# Patient Record
Sex: FEMALE | Race: WHITE
[De-identification: names, ages, dates, MRNs, and addresses within clinical notes are randomized per-mention and may not be internally consistent; named-entity substitution may affect disease eponyms.]

---

## 2019-12-23 ENCOUNTER — HOSPITAL ENCOUNTER (OUTPATIENT)
Dept: HOSPITAL 84 - D.HCCARDIO | Age: 69
Discharge: HOME | End: 2019-12-23
Attending: INTERNAL MEDICINE
Payer: MEDICARE

## 2019-12-23 VITALS — BODY MASS INDEX: 40.4 KG/M2

## 2019-12-23 DIAGNOSIS — I25.119: Primary | ICD-10-CM

## 2020-01-20 ENCOUNTER — HOSPITAL ENCOUNTER (OUTPATIENT)
Dept: HOSPITAL 84 - D.CATH | Age: 70
End: 2020-01-20
Attending: INTERNAL MEDICINE
Payer: MEDICARE

## 2020-01-20 VITALS
WEIGHT: 212.45 LBS | HEIGHT: 64 IN | WEIGHT: 212.45 LBS | BODY MASS INDEX: 36.27 KG/M2 | BODY MASS INDEX: 36.27 KG/M2 | HEIGHT: 64 IN

## 2020-01-20 VITALS — DIASTOLIC BLOOD PRESSURE: 85 MMHG | SYSTOLIC BLOOD PRESSURE: 170 MMHG

## 2020-01-20 DIAGNOSIS — E78.5: ICD-10-CM

## 2020-01-20 DIAGNOSIS — Z79.84: ICD-10-CM

## 2020-01-20 DIAGNOSIS — I10: ICD-10-CM

## 2020-01-20 DIAGNOSIS — E11.9: ICD-10-CM

## 2020-01-20 DIAGNOSIS — I25.110: Primary | ICD-10-CM

## 2020-01-20 LAB
ALT SERPL-CCNC: 46 U/L (ref 10–68)
ANION GAP SERPL CALC-SCNC: 10.6 MMOL/L (ref 8–16)
BASOPHILS NFR BLD AUTO: 0.1 % (ref 0–2)
BUN SERPL-MCNC: 10 MG/DL (ref 7–18)
CALCIUM SERPL-MCNC: 9 MG/DL (ref 8.5–10.1)
CHLORIDE SERPL-SCNC: 101 MMOL/L (ref 98–107)
CHOLEST/HDLC SERPL: 1.6 RATIO (ref 2.3–4.1)
CO2 SERPL-SCNC: 27.4 MMOL/L (ref 21–32)
CREAT SERPL-MCNC: 0.7 MG/DL (ref 0.6–1.3)
EOSINOPHIL NFR BLD: 1.2 % (ref 0–7)
ERYTHROCYTE [DISTWIDTH] IN BLOOD BY AUTOMATED COUNT: 13.1 % (ref 11.5–14.5)
GLUCOSE SERPL-MCNC: 366 MG/DL (ref 74–106)
HCT VFR BLD CALC: 41.8 % (ref 36–48)
HDLC SERPL-MCNC: 67 MG/DL (ref 32–96)
HGB BLD-MCNC: 14.7 G/DL (ref 12–16)
IMM GRANULOCYTES NFR BLD: 0.5 % (ref 0–5)
LDL-HDL RATIO: 0.4 RATIO (ref 1.5–3.5)
LDLC SERPL-MCNC: 30 MG/DL (ref 0–100)
LYMPHOCYTES NFR BLD AUTO: 20.5 % (ref 15–50)
MCH RBC QN AUTO: 32.5 PG (ref 26–34)
MCHC RBC AUTO-ENTMCNC: 35.2 G/DL (ref 31–37)
MCV RBC: 92.3 FL (ref 80–100)
MONOCYTES NFR BLD: 6.1 % (ref 2–11)
NEUTROPHILS NFR BLD AUTO: 71.6 % (ref 40–80)
OSMOLALITY SERPL CALC.SUM OF ELEC: 283 MOSM/KG (ref 275–300)
PLATELET # BLD: 170 10X3/UL (ref 130–400)
PMV BLD AUTO: 11.2 FL (ref 7.4–10.4)
POTASSIUM SERPL-SCNC: 4 MMOL/L (ref 3.5–5.1)
RBC # BLD AUTO: 4.53 10X6/UL (ref 4–5.4)
SODIUM SERPL-SCNC: 135 MMOL/L (ref 136–145)
TRIGL SERPL-MCNC: 55 MG/DL (ref 30–200)
WBC # BLD AUTO: 8.2 10X3/UL (ref 4.8–10.8)

## 2020-01-20 PROCEDURE — 93458 L HRT ARTERY/VENTRICLE ANGIO: CPT

## 2020-01-20 NOTE — NUR
HEAD OF BED ELEVATED TO 45 DEGREES. RIGHT GROIN DRESSING IS CDI,N O
S/S OF BLEEDING OR HEMATOMA. PATIENT GIVEN TURKEY SANDWICH AND COFFEE
PER REQUEST, NO N/V. VSS ON ROOM AIR. FAMILY PRESENT AT BEDSIDE.

## 2020-01-20 NOTE — NUR
PATIENT GIVEN ZOFRAN AS ORDERED. RIGHT GROIN DRESSING IS CDI, NO S/S
OF BLEEDING OR HEMATOMA. NO C/O PAIN, NUMBNESS, OR TINGLING. WILL
CONTINUE TO MONITOR.

## 2020-01-20 NOTE — NUR
PATIENT AWAKE, VSS ON ROOM AIR. RIGHT GROIN DRESSING IS CDI, NO S/S
OF BLEEDING OR HEMATOMA. NO C/O PAIN, NUMBNESS, OR TINGLING. FAMILY
PRESENT AT BEDSIDE.

## 2020-01-20 NOTE — NUR
PATIENT ARRIVED TO ROOM 3, PLACED ON CM. VSS ON 2L NC. RIGHT GROIN
DRESSING IS CDI, NO S/S OF BLEEDING OR HEMATOMA.

## 2020-01-20 NOTE — NUR
HEAD OF BED AT 90 DEGREES. RIGHT GROIN DRESSING IS CDI, NO S/S OF
BLEEDING OR HEMATOMA. NO C/O PAIN, NUMBNESS, OR TINGLING. PATIENT
VOIDED WITHOUT DIFFICULTY. WRITTEN AND VERBAL DISCHARGE INSTRUCTIONS
GIVEN TO PATIENT AND FAMILY, ALL QUESTIONS ANSWERED. PERIPHERAL IV
REMOVED. PATIENT DISCONNECTED FROM MONITORS TO GET DRESSED.

## 2020-01-20 NOTE — HEMODYNAMI
PATIENT:LIAM COVARRUBIAS                                       MEDICAL RECORD: [id number]
: 48                                            LOCATION:[facility]          
ACCT# [id number]                                       ADMISSION DATE: 20
 
 
 Generatedon:202013:44
Patient name: LIAM COVARRUBIAS Patient #: [id number] Visit #: [id number] SSN: [id number]
234 : 1950
Date of study: 2020
Page: Of
Hemodynamic Procedure Report
****************************
Patient Data
Patient Demographics
Procedure consent was obtained
First Name: LIAM            Gender: Female
Last Name: SATISH            : 1950
Patient #: [id number]       Age: 69 year(s)
Visit #: [id number]       Race: 
SSN: [id number]
Accession #:
[id number]
Additional ID: [id number]
Contact details
Address: [address] [place] [address] Phone: [phone]
State: [place]
City: [place]
Zip code: [address]
Past Medical History
Allergies
Allergen   Reaction   Date      Comments
Reported
Other                 2020 CODEINE/SULFA(SULFONAMIDE
allergy                         ANTIBIOTICS)
Admission
Admission Data
Admission Date: 2020   Admission Time: 10:28
Arrival Date: 2020     Arrival Time: 0:00
Admit Source: Other         Insurance Payor: Medicaid
[facility] #: [id number]
Height (in.): 64.17         BSA: 2.01 (m2)
Height (cm.): 163           BMI: 36.13 (kg/m2)
Weight (lbs.): 211.64
Weight (kg.): 96
Lab Results
Lab Result Date: 2020  Lab Result Time: 0:00
Biochemistry
Name         Units    Result                Min      Max
BUN          mg/dl    10       --(-*--)--   7        18
Creatinine   mg/dl    0.7      --(*---)--   0.6      1.3
eGFR         ml/min   88.31332 -*(----)--   90       120
NONAFRICAN
CBC
Name         Units    Result                Min      Max
Hematocrit   %        41.8     -*(----)--   42       54
Hemoglobin   g/dl     14.7     --(-*--)--   13.5     17.5
Procedure
Procedure Types
Cath Procedure
 
Diagnostic Procedure
[facility] w/Coronaries
Sedation Charges
Moderate Sedation up to 15 minutes
PCI Procedure
Coronary Stent
Coronary Stent Initial
Procedure Description
Procedure Date
Procedure Date: 2020
Procedure Start Time: 13:19
Procedure End Time: 13:42
Procedure Staff
Name                            Function
Fabian Wheeler MD              Performing Physician
John Shepard RT                  Monitor
Dayami Perez RN                Nurse
Niya Murry RN                  Nurse
Melly Barros RT              Scrub
Indication
Angina
Procedure Data
Cath Procedure
Fluoroscopy
Diagnostic fluoroscopy      Total fluoroscopy Time: 3.3
time: 3.3 min               min
Diagnostic fluoroscopy      Total fluoroscopy dose: 643
dose: 643 mGy               mGy
Contrast Material
Contrast Material Type                       Amount (ml)
Isovue 300                                   93
Entry Location
Entry     Primary  Successful  Side  Size  Upsize Upsize Entry    Closure Succes
sful  Closure
Location                             (Fr)  1 (Fr) 2 (Fr) Remarks  Device        
      Remarks
Femoral                        Right 5 Fr  6 Fr                   Exoseal
artery                                     Short
Estimated blood loss: 10 ml
Diagnostic catheters
Device Type               Used For           End Catheter
Placement
MULTIPACK JL 4.0 5Fr      Procedure
catheter
MULTIPACK 3DRC 5Fr        Procedure
catheter
MULTIPACK Pigtail 5 Fr    Procedure
catheter
Procedure Complications
No complications
Procedure Medications
Medication           Administration Route Dosage
Oxygen               etCO2 Nasal cannula  2 l/min
Heparin Flush Bag    added to field       2 bags
(1000units/500ml NS)
Lidocaine 2%         added to field       20
0.9% NaCl            I.V.                 100 ml/hr
Versed               I.V.                 1 mg
Fentanyl             I.V.                 50 mcg
 
Heparin Bolus        I.V.                 4000 units
Integrilin (Bolus    I.V.                 8.5 ml
2mg/ml)
Plavix               P.O.                 600 mg
Hemodynamics
Rest
BSA: 2.01 (m2) O2 Consumption: Estimated: 194.28 (ml/min) O2 Consumption indexed
: Estimated:96.66
(ml/min/m) Heart Rate: 82 (bpm)
Pressure Samples
Time     Site     Value (mmHg) Purpose      Heart      Use
Rate(bpm)
13:25    LV       104/9,10     Snapshot     100
13:26    AO       110/59(77)   Pullback     88
13:34    AO       91/52(69)    Snapshot     90
Gradients
Valve  Time  Site Site 2     Mean    SEP/DFP    Peak To Heart  Use
1               (mmHg)  (sec/min)  Peak    Rate
(mmHg)  (bpm)
Aortic 13:26 LV   AO         7       7                  88
110/59(77)
Calculations
Valve    P-P      Mean      Valve     Index  Valve    Source
Name              Gradient  Area             Flow
(cm2)
Aortic            7
7
Snapshots
Pre Cath      Intra         NCS           Post Cath
Vital Signs
Time     Heart  Resp   SPO2 etCO2   NIBP (mmHg) Rhythm  Pain    Sedation
Rate   (ipm)  (%)  (mmHg)                      Status  Level
(bpm)
13:10:32 82     18     99   0       161/87(126) NSR     0 (11)  10(A)
, No
pain
13:14:43 82     16     97   0       132/79(109) NSR     0 (11)  10(A)
, No
pain
13:19:04 88     19     96   0       135/67(101) NSR     0 (11)  10(A)
, No
pain
13:23:20 86     17     93   0       122/74(97)  NSR     0 (11)  9(A)
, No
pain
13:27:29 87     18     95   0       123/67(94)  NSR     0 (11)  9(A)
, No
pain
13:31:41 87     20     95   0       103/63(86)  NSR     0 (11)  9(A)
, No
pain
13:35:49 92     18     96   0       122/71(97)  NSR     0 (11)  9(A)
, No
pain
13:39:57 96     16     95   0       135/82(114) NSR     0 (11)  9(A)
, No
pain
Medications
Time     Medication       Route   Dose  Verified Delivered Reason          Notes
    Effectiveness
 
by       by
13:05:48 Oxygen           etCO2   2     Dayami    Dayami     for low 02 sats
Nasal   l/min Ana Perez
cannula       RN       RN
13:06:01 Heparin Flush    added   2     Dayami    Dayami     used for
Bag              to      bags  Perezquan Perez   procedure
(1000units/500ml field         RN       RN
NS)
13:06:13 Lidocaine 2%     added   20ml  Dayami    Fabian   for local
to      vial  [facility]   anesthetic
field ADELE LAO
13:09:47 0.9% NaCl        I.V.    100   Dayami    Dayami     Per physician
ml/hr Ana Perez RN       RN
13:19:24 Versed           I.V.    1 mg  Dayami    Dayami     for sedation
Ana Perez RN       RN
13:19:28 Fentanyl         I.V.    50    Dayami    Dayami     for sedation
mcg   Ana Perez RN       RN
13:28:00 Heparin Bolus    I.V.    4000  Dayami    Dayami     for             hepar
in
units Ana Perez   anticoagulation verified
RN       RN                        with
niya murry rn
13:28:27 Integrilin       I.V.    8.5ml Dayami    Dayami                     waste
d
(Bolus 2mg/ml)                 Ana Perez                   1.5ml
RN       RN
13:40:00 Plavix           P.O.    600   Dayami Guptaika     for
mg    Ana Perez   antiplatelet
RN       RN        therapy
Procedure Log
Time     Note
12:45:37 Niya Murry RN sent for patient. Start room use.
12:45:57 Informed consent obtained and on chart
12:49:21 Indication : Angina
12:49:40 Procedure Status Elective Heart Cath (OP).
12:49:45 Time tracking: Regular hours (M-F 7:00 - 5:00)
12:50:23 Arrival Date: 2020 12:00:00 AM
12:51:03 Insurance Payor : Medicaid
12:51:05 Admit Source: Other
12:51:19 Patient Height : 64.17 inches
12:51:26 Patient Weight : 211.64 lbs
12:52:46 Plan of Care:Hemodynamics will remain stable., Cardiac
rhythm will remain stable., Comfort level will be
maintained., Respiratory function will remain
adequate., Patient/ family verbilizes understanding of
procedure., Procedure tolerated without complication.,
Recovers from procedure without complications..
12:53:43 Lab Result : Creatinine 0.7 mg/dl
12:53:43 Lab Result : BUN 10 mg/dl
12:53:43 Lab Result : eGFR NONAFRICAN 88.23832 ml/min
12:53:43 Lab Result : Hematocrit 41.8 %
12:53:43 Lab Result : Hemoglobin 14.7 g/dl
12:54:31 Patient allergic to Other
allergyCODEINE/SULFA(SULFONAMIDE ANTIBIOTICS)
12:57:16 Risk of Mortality: 0.7
12:57:22 Risk of blood transfusion: 0.4
 
12:57:27 Risk of HERON: 1.3
12:58:13 Patient received from Pre/Post Procedure Room to CCL 1
Alert and oriented. Tansferred to table in Supine
position.
13:05:48 Oxygen 2 l/min etCO2 Nasal cannula was administered by
Dayami Perez RN; for low 02 sats; Verbal order read
back and verified.
13:06:01 Heparin Flush Bag (1000units/500ml NS) 2 bags added to
field was administered by Dayami Perez RN; used for
procedure; Verbal order read back and verified.
13:06:13 Lidocaine 2% 20ml vial added to field was administered
by Fabian Wheeler MD; for local anesthetic; Verbal
order read back and verified.
13:09:16 Warm blankets applied, and merry hugger turned on for
patient comfort.
13:09:16 Correct patient and procedure confirmed by team.
13:09:17 ECG and BP/O2 sat monitors applied to patient.
13:09:18 Vital chart was started
13:09:47 0.9% NaCl 100 ml/hr I.V. was administered by Dayami Perez RN; Per physician; Verbal order read back and
verified.
13:09:50 Baseline sample Acquired.
13:11:05 Rhythm: unchanged.
13:11:17 Full Disclosure recording started
13:11:48 H&P Date Dictated: 2020 Within 30 days and on
chart..
13:11:50 Pre-procedure instructions explained to patient.
13:11:51 Pre-op teaching completed and patient verbalized
understanding.
13:11:53 Family in patients room.
13:11:55 Patient NPO since Midnight.
13:12:21 Is the patient allergic to Iodine/contrast media? No.
13:12:33 Is patient on blood thinner?No
13:12:37 **ACC** The patient was administered the following
blood thiners within the last 24 hours: None
13:13:04 Patient diabetic? Yes.
13:13:14 If diabetic: On Metformin? Yes
13:13:16 If on Metformin: Last Dose? 2020
13:13:19 Previous problem with sedation/anesthesia? No ?
13:13:20 Snore? Yes
13:13:21 Sleep apnea? No
13:13:22 Deviated septum? No
13:13:23 Opens mouth fully? Yes
13:13:25 Sticks out tongue? Yes
13:13:29 Airway obstruction? No ?
13:13:32 Dentures? No ?
13:13:40 Pre procedure: right dorsailis pedis pulse 1+
Palpable, but thready & weak; easily obliterated
13:13:42 Patient pain scale 0/10 ?.
13:17:04 No radial due to hx of TIA and Stroke.
13:18:20 IV patent on arrival in right forearm with 0.9% NaCl
at [facility].
13:18:24 Lab results completed and on chart.
13:18:30 Right groin area was prepped with chlora-prep and
draped in sterile fashion
13:18:31 Alarms reviewed by R. N.
13:18:31 Sharps counted by scrub and verified by R.N.
13:18:40 Physician arrived
13:18:41 --------ALL STOP TIME OUT------
13:18:45 Final Timeout: patient, procedure, and site verified
 
with staff and physician. All members of the team are
in agreement.
13:18:49 Right groin site verified by team.
13:18:52 Fire Safety Assessment: A--An alcohol-based skin
anteseptic being used preoperatively., C--Open oxygen
or nitrous oxide is being used., D--An ESU, laser, or
fiber-optic light is being used.
13:18:54 Physical assessment completed. ASA score P 2 - A
patient with mild systemic disease as per Fabian Wheeler MD.
13:18:58 Sedation plan: IV Moderate Sedation Medication:Versed,
Fentanyl
13:19:12 2) 60-89 Mildly reduced kidney function, and other
findings (as for stage 1) point to kidney disease.
13:19:16 Maximum allowable contrast dose (3.7 X eGFR X 0.75)244
ml.
13:19:24 Versed 1 mg I.V. was administered by Dayami Perez RN;
for sedation; Verbal order read back and verified.
13:19:28 Fentanyl 50 mcg I.V. was administered by Dayami Perez RN; for sedation; Verbal order read back and verified.
13:19:37 Use device set Femoral Dx
13:19:40 Tegaderm 4 x 4 (1626W) opened to sterile field.
13:19:41 ACIST Manifold (76275) opened to sterile field.
13:19:42 ACIST Hand Control (17064) opened to sterile field.
13:19:43 ACIST Syringe (20296) opened to sterile field.
13:19:44 Bag Decanter (2002S) opened to sterile field.
13:19:44 Medline Cath Pack ([id number]) opened to sterile field.
13:19:46 DIAGNOSTIC Multipack 5Fr catheter set (ZP0372) opened
to sterile field.
13:19:48 SHEATH 5FR [place] (VYX664) opened to sterile field.
13:19:48 EMERALD Guide Wire (041-800) opened to sterile field.
13:19:53 Procedure started.
13:19:59 Local anesthetic to right femoral artery with
Lidocaine 2% by Fabian Wheeler MD.**INITIAL ACCESS
ONLY**
13:20:53 A 5 Fr sheath was inserted into the Right Femoral
artery
13:21:11 A MULTIPACK JL 4.0 5Fr catheter was advanced over the
wire and used for Procedure.
13:21:58 LCA angiography performed.
13:22:35 Catheter removed.
13:22:46 A MULTIPACK 3DRC 5Fr catheter was advanced over the
wire and used for Procedure.
13:23:41 RCA angiography performed.
13:23:49 **ACC**Dominant side:Left
13:23:50 Catheter removed.
13:23:56 A MULTIPACK Pigtail 5 Fr catheter was advanced over
the wire and used for Procedure.
13:24:51 LV angiography performed.
13:24:52 LV gram done using WALKER
13:25:10 EF : 55 %
13:25:13 LV hemodynamics recorded.
13:25:17 Injector settings: Ml/sec: 5, Volume: 15,
13:25:58 Catheter removed.
13:26:24 Use device set ST LOPES PCI
13:26:26 SHEATH 6FR [place] (WQF301) opened to sterile field.
13:26:31 INFLATOR Merit BasixCompak (TV2121) opened to sterile
field.
13:26:35 WHISPER 300cm guide wire ([id number]) opened to sterile
field.
 
13:26:39 GUIDE 6FR XBLAD 3.5 catheter ([id number]) opened to
sterile field.
13:27:36 Sheath upsized to a 6 Fr Short.
13:27:50 Pre PCI Site: Native pLAD has 80% stenosis.
13:27:53 **ACC** Pre-intervention JODEE Flow is 3.
13:28:00 Heparin Bolus 4000 units I.V. was administered by
Dayami Perez RN; for anticoagulation; heparin
verified with niya murry rn Verbal order read back
and verified.
13:28:07 6 Fr XBLAD 3.5 guide catheter was inserted over the
wire
13:28:24 Whisper wire advanced.
13:28:27 Integrilin (Bolus 2mg/ml) 8.5ml I.V. was administered
by Dayami Perez RN; ; wasted 1.5ml Verbal order read
back and verified.
13:28:41 Wire advanced across lesion.
13:35:00 Place stent Inflation Number: 1 A KAROLINE RX 3.0 x 18
stent ([id number]) was prepped and advanced across
the Prox LAD 80. The stent was deployed at 14 LEN for
0:30 (min:sec) 0.
13:35:25 Stent catheter was removed intact over wire.
13:35:26 Wire removed.
13:35:27 Guide catheter removed.
13:35:37 Post PCI Site: Native pLAD has 0% stenosis.
13:35:41 **ACC** Post-intervention JODEE Flow is 3.
13:35:43 EXOSEAL 6Fr () opened to sterile field.
13:35:56 Sheath removed intact; hemostasis achieved with
Exoseal to the Right Femoral artery.
13:36:08 Procedure ended.(Physican Out)
13:40:00 Plavix 600 mg P.O. was administered by Dayami Perez
RN; for antiplatelet therapy; Verbal order read back
and verified.
13:40:14 Fluoroscopy time 03.30 minutes.
13:40:19 Fluoroscopy dose: 643 mGy
13:40:19 Flurop Dose total: 643
13:40:25 Dose Area Product 25350 mGy/cm.
13:40:35 Contrast amount:Isovue 300 93ml.
13:40:38 Maximum allowable dose exceeded? No.
13:40:40 Sharps counted by scrub and verified by R.N.
13:40:41 Insertion/operative site no bleeding no hematoma.
13:40:44 Post-op/insertion site Right Femoral artery dressed
using a 4 x 4 and Tegaderm.
13:40:45 Post Procedure Pulses reassessed and unchanged
13:40:48 Post-procedure physical assessment completed. ASA
score P 2 - A patient with mild systemic disease as
per Fabian Wheeler MD.
13:40:50 Post procedure rhythm: unchanged.
13:40:54 Estimated blood loss: 10 ml
13:40:56 Post procedure instruction explained to
patient.Patient verbalizes understanding.
13:40:57 Patient needs reinforcement of post procedure
teaching.
13:41:23 Procedure type changed to Cath procedure, Diagnostic
procedure, LHC, LHC w/Coronaries, Sedation Charges,
Moderate Sedation up to 15 minutes, PCI procedure,
Coronary Stent, Coronary Stent Initial
13:41:24 Procedure and supply charges have been captured,
reviewed, submitted and are correct.
13:41:27 Procedure Complication : No complications
13:42:00 ACT drawn and resulted at 289 seconds. (normal
 
therapeutic range 180-240 seconds).
13:42:08 Vital chart was stopped
13:42:11 [facility] Findings: MVD- PCI performed (see procedure note)
13:42:12 Operative report dictated upon procedure completion.
13:42:13 See physician's report for complete and final results.
13:42:18 Report given to Pre/Post Procedure Room.
13:42:21 Patient transfered to Pre/Post Procedure Room with
Stretcher.
13:42:23 Procedure ended.
13:42:23 Full Disclosure recording stopped
13:42:28 End room use (Document Last)
13:42:51 End room use (Document Last)
13:43:23 End room use (Document Last)
Intervention Summary
Intervention Notes
Time     ActionType  Lesion and  Equipment Used Action#  Pressure  Duration
Attributes
13:35:00 Place stent Prox LAD    KAROLINE RX 3.0 x  1        14        00:30
18 stent
([id number])
Device Usage
Item Name      Manufacture  Quantity  Catalog       Hospital Part     Current Mi
nimal Lot# /
Number        Charge   Number   Stock   Stock   Serial#
Code
Tegaderm 4 x 4 3M           1         1626W         [id number]   741419   222681  5
(1626W)
ACIST Manifold Acist        1         17489         810080   836497   816378  5
(98954)        Medical
Systems Inc
ACIST Hand     Acist        1         38934         644692   866936   845505  5
Control        Medical
(66039)        Systems Inc
ACIST Syringe  Acist        1         02915         189006   421457   538795  20
(27735)        Medical
Systems Inc
Bag Decanter   Microtek     1         S         651935   04367    228188  5
(S)        Medical Inc.
Medline Cath   Medline      1         [id number]     780363   97199    844158  5
Pack
([id number])
DIAGNOSTIC     Cardinal     1         NX4131        873986   40349    620150  30
Multipack 5Fr  Health
catheter set
(OC6710)
SHEATH 5FR     Terumo       1         FOA450        793425   478460   204823  5
[place]
(EQK171)
EMERALD Guide  Cardinal     1         502-455       618746   212211   444166  5
Wire (502-229) Health
MULTIPACK JL   Cardinal     1                                         397324  5
4.0 5Fr        Health
catheter
MULTIPACK 3DRC Cardinal     1                                         943602  5
5Fr catheter   Health
MULTIPACK      Cardinal     1                                         388564  5
Pigtail 5 Fr   Health
catheter
SHEATH 6FR     Terumo       1         GPA686        654665   160443   950422  40
[place]
 
(FRK737)
INFLATOR Merit Merit        1         LS7395        926997   348435   516537  15
[facility]    Medical
(AG1499)
WHISPER 300cm  Thomas       1         [id number]     000312   627852   631508  5
guide wire     Vascular
([id number])
GUIDE 6FR      Cardinal     1         [id number]      760243   802454   782840  10
XBLAD 3.5      Health
catheter
([id number])
KAROLINE RX 3.0 x  Medtronic    1         [id number]  043499   7056262  137310  5 
      [id number]
18 stent
([id number])
EXOSEAL 6Fr    Cardinal     1                  380006   958019   535043  10
()        Health
Signature Audit [place]
Stage           Time        Signature      Unsigned
Intra-Procedure 2020   John Shepard
1:42:51 PM  RT(R)
Intra-Procedure 2020   Niya Murry RN
1:43:23 PM
Intra-Procedure 2020   Fabian Logan
1:44:05 PM  Bruno LAO
 
 
 
 
 
 
 
 
 
 
 
 
 
 
 
 
 
 
 
 
 
 
 
 
 
 
 
 
 
 
[facility]                                          
[address] [place] [address]

## 2020-01-20 NOTE — NUR
PATIENT RESTING, VSS ON 2L NC. RIGHT GROIN DRESSING IS CDI, NO S/S OF
BLEEDING OR HEMATOMA. NO C/O PAIN, NUMBNESS, OR TINGLING. TOLERATING
PO FLUIDS, NO N/V. FAMILY PRESENT AT BEDSIDE.

## 2020-01-20 NOTE — NUR
PATIENT INTERMITTENTLY RESTING. VSS ON ROOM AIR. RIGHT GROIN DRESSING
IS CDI, NO S/S OF BLEEDING OR HEMATOMA. NO C/O PAIN, NUMBNESS, OR
TINGLING. NO N/V. FAMILY PRESENT AT BEDSIDE.

## 2020-01-20 NOTE — NUR
PATIENT RESTING, VSS ON 2L NC. RIGHT GROIN DRESSING IS CDI, NO S/S OF
BLEEDING OR HEMATOMA. NO C/O PAIN, NUMBNESS, OR TINGLING. NO N/V.
PATIENT FAMILY PRESENT AT BEDSIDE.

## 2020-01-21 NOTE — OP
PATIENT NAME:  LIAM COVARRUBIAS                                MEDICAL RECORD: [id number]
:[birth year]                                             LOCATION:D[facility]CAT          
                                                         ADMISSION DATE:        
SURGEON:  MANNY SIMON MD          
 
 
DATE OF OPERATION:  2020
 
PROCEDURES:  Left heart catheterization, selective coronary angiography, right
femoral artery approach.
 
CATHETERS:  A 5-Azerbaijani sheath, 5/4 left and right [place], 5/4 pig.
 
The procedure was well tolerated.  The patient returned to the mitchell, sheath
removed.  ExoSeal device placed.
 
FINDINGS:  Left ventriculography in 30-degree WALKER view:  Normal wall motion,
normal systolic function.
 
CORONARY ANATOMY:
LEFT MAIN:  Left main is free of disease.
LAD:  Has a diffuse 80% stenosis before the previously placed stent of 80%.
CIRCUMFLEX:  Has distal typical of diabetic disease.
 
RIGHT CORONARY ARTERY:  The right coronary artery is a moderate-sized right
dominant system, free of disease.
 
PLAN:  Intervention to the LAD momentarily.
 
DESCRIPTION OF PROCEDURE:  A 5-Azerbaijani sheath was exchanged for a 6-Azerbaijani
sheath.  XB LAD provided good guide catheter support, followed by 300 cm Whisper
wire, it was placed across the occluded 80% at the distal portion of vessel. 
Stent deployed was a 3.0 x 18 mm Victor Hugo drug-eluting stent up to 14 atmospheres
for 45 seconds.  Final angiography showed excellent resolution from 80% stenosis
to no significant residual.  JODEE flow was 3 throughout the procedure.  Sheath
was closed with ExoSeal device.  Plavix loaded in the lab.
 
TRANSINT:[id number] Voice Confirmation ID: [id number] DOCUMENT ID: [id number]
                                           
                                           MANNY SIMON MD          
 
 
 
Electronically Signed by MANNY SIMON on 20 at 1458
 
 
 
 
 
 
CC:                                                             [id number]
DICTATION DATE: 20 1345     :     20 1859      DEP CLI 
                                                                      20
[place] [id number]